# Patient Record
Sex: MALE | HISPANIC OR LATINO | Employment: OTHER | URBAN - METROPOLITAN AREA
[De-identification: names, ages, dates, MRNs, and addresses within clinical notes are randomized per-mention and may not be internally consistent; named-entity substitution may affect disease eponyms.]

---

## 2020-08-29 ENCOUNTER — HOSPITAL ENCOUNTER (EMERGENCY)
Facility: OTHER | Age: 30
Discharge: HOME | End: 2020-08-30
Attending: EMERGENCY MEDICINE

## 2020-08-29 DIAGNOSIS — R05.9 COUGH: ICD-10-CM

## 2020-08-29 DIAGNOSIS — J18.9 PNEUMONIA OF RIGHT MIDDLE LOBE DUE TO INFECTIOUS ORGANISM: Primary | ICD-10-CM

## 2020-08-29 DIAGNOSIS — R04.2 COUGH WITH HEMOPTYSIS: ICD-10-CM

## 2020-08-29 PROCEDURE — 99283 EMERGENCY DEPT VISIT LOW MDM: CPT | Mod: 25

## 2020-08-30 ENCOUNTER — TELEPHONE (OUTPATIENT)
Dept: EMERGENCY MEDICINE | Facility: OTHER | Age: 30
End: 2020-08-30

## 2020-08-30 VITALS
SYSTOLIC BLOOD PRESSURE: 133 MMHG | HEIGHT: 68 IN | DIASTOLIC BLOOD PRESSURE: 70 MMHG | TEMPERATURE: 98 F | OXYGEN SATURATION: 99 % | HEART RATE: 70 BPM | RESPIRATION RATE: 18 BRPM | BODY MASS INDEX: 25.01 KG/M2 | WEIGHT: 165 LBS

## 2020-08-30 PROCEDURE — 63700000 PHARM REV CODE 250 ALT 637 W/O HCPCS: Performed by: EMERGENCY MEDICINE

## 2020-08-30 RX ORDER — AZITHROMYCIN 250 MG/1
250 TABLET, FILM COATED ORAL DAILY
Qty: 6 TABLET | Refills: 0 | Status: SHIPPED | OUTPATIENT
Start: 2020-08-30

## 2020-08-30 RX ORDER — AZITHROMYCIN 250 MG/1
500 TABLET, FILM COATED ORAL
Status: COMPLETED | OUTPATIENT
Start: 2020-08-30 | End: 2020-08-30

## 2020-08-30 RX ADMIN — AZITHROMYCIN MONOHYDRATE 500 MG: 250 TABLET ORAL at 12:08

## 2020-08-30 NOTE — ED PROVIDER NOTES
Encounter Date: 8/29/2020    SCRIBE #1 NOTE: I, Damaris Taco, am scribing for, and in the presence of, Dr. Álvarez.       History     Chief Complaint   Patient presents with    Hemoptysis     anytime he coughs, blood mixed with mucus comes up.      Time seen by provider: 11:21 PM    This is a 29 y.o. male who presents with complaint of cough since yesterday evening with small specks of blood in saliva. He states that cough is mostly producing saliva and denies any presence of blood clots in sputum. He denies any associated pain or shortness of breath. He denies any recent fever, N/V, dyspnea on exertion, or concern for COVID19 infection. He smokes 3-4 cigarettes per day. He does not drink alcohol. He denies any history of asthma, lung disease, or tuberculosis. He denies additional complaints at this time.    The history is provided by the patient.     Review of patient's allergies indicates:  No Known Allergies  History reviewed. No pertinent past medical history.  No past surgical history on file.  No family history on file.  Social History     Tobacco Use    Smoking status: Not on file   Substance Use Topics    Alcohol use: Not on file    Drug use: Not on file     Review of Systems   Constitutional: Negative for fever.   HENT: Negative for rhinorrhea and sore throat.    Eyes: Negative for photophobia.   Respiratory: Positive for cough (with specks of blood in sputum). Negative for shortness of breath.    Cardiovascular: Negative for chest pain.   Gastrointestinal: Negative for nausea and vomiting.   Musculoskeletal: Negative for back pain.   Skin: Negative for rash.   Neurological: Negative for weakness.   Psychiatric/Behavioral: Negative for confusion.       Physical Exam     Initial Vitals [08/29/20 2257]   BP Pulse Resp Temp SpO2   132/82 90 18 98.1 °F (36.7 °C) 99 %      MAP       --         Physical Exam    Nursing note and vitals reviewed.  Constitutional: He appears well-developed and well-nourished. He  is not diaphoretic. No distress.   HENT:   Head: Normocephalic and atraumatic.   Mouth/Throat: Oropharynx is clear and moist.   Eyes: Conjunctivae and EOM are normal. Pupils are equal, round, and reactive to light.   No pallor or icterus.   Neck: Normal range of motion. Neck supple.   Cardiovascular: Normal rate, regular rhythm and normal heart sounds.   Pulmonary/Chest: Breath sounds normal. No respiratory distress.   Good air exchange. No basilar rales. No expiratory wheeze. No cough during exam.   Abdominal: Soft. Bowel sounds are normal. There is no abdominal tenderness.   Musculoskeletal: Normal range of motion. No tenderness or edema.   Neurological: He is alert and oriented to person, place, and time.   Skin: Skin is warm and dry. Capillary refill takes less than 2 seconds. No rash noted.   Psychiatric: Judgment and thought content normal.         ED Course   Procedures  Labs Reviewed - No data to display       Imaging Results          X-Ray Chest PA And Lateral (Final result)  Result time 08/29/20 23:38:39    Final result by Brennan Andersen MD (08/29/20 23:38:39)                 Impression:      Mild right middle lobe infiltrate could represent mild pneumonitis.  Recommend clinical correlation.      Electronically signed by: Brennan Andersen  Date:    08/29/2020  Time:    23:38             Narrative:    EXAMINATION:  XR CHEST PA AND LATERAL    CLINICAL HISTORY:  Cough    TECHNIQUE:  PA and lateral views of the chest were performed.    COMPARISON:  None    FINDINGS:  Heart is normal in size.  No edema is detected.    Mild obscuration of the right heart border could represent mild infiltrate of the right middle lobe.    No significant abnormality elsewhere.    No effusion or pneumothorax.  No acute osseous abnormality.                              X-Rays:   Independently Interpreted Readings:   Chest X-Ray: Increased markings to right perihilar area. No pneumothorax or effusion.     Medical Decision Making:    History:   Old Medical Records: I decided to obtain old medical records.  Independently Interpreted Test(s):   I have ordered and independently interpreted X-rays - see prior notes.  Clinical Tests:   Radiological Study: Ordered and Reviewed            Scribe Attestation:   Scribe #1: I performed the above scribed service and the documentation accurately describes the services I performed. I attest to the accuracy of the note.    Attending Attestation:           Physician Attestation for Scribe:  Physician Attestation Statement for Scribe #1: I, Dr. Álvarez, reviewed documentation, as scribed by Damaris España in my presence, and it is both accurate and complete.                    Healthy male who presents complaining of cough with scant blood.  He denies fevers or chills, denies exposure to COVID.  He does smoke cigarettes.  Counseled on cessation.  He denies feeling short of breath, even with exertion well-appearing on exam, afebrile with stable vital signs.  Chest x-ray shows mild infiltrate.  No apically lesions, no history of exposure to TB.  No weight loss.  Will treat presumptively for community-acquired pneumonia with Z-Santhosh.  Return precautions discussed.  Encouraged follow-up with primary care, especially if symptoms persist.             Clinical Impression:     1. Pneumonia of right middle lobe due to infectious organism    2. Cough    3. Cough with hemoptysis                ED Disposition Condition    Discharge Stable        ED Prescriptions     None        Follow-up Information     Follow up With Specialties Details Why Contact Info Additional Information    [facility] [address] Internal Medicine In 1 week  [address] [id number] [phone] Internal Medicine - [facility], 8th Floor, [address] Please park in [address] and use [place] elevators                                     Fran Álvarez II, MD  08/30/20 4820

## 2020-08-30 NOTE — ED TRIAGE NOTES
Pt presents to the Ed with a complaint of coughing up blood sometimes. Pt states that it is mixed with mucus. Denies fever or pain.     Patient identifiers verified and correct.     APPEARANCE: Patient not in acute distress.  NEURO: Awake, alert, appropriate for age, condition, and situation, pupils equal, round, and reactive.   HEENT: Head symmetrical. Eyes bilateral.  Bilateral ears without drainage. Bilateral nares patent, throat clear.  CARDIAC: Regular rate and rhythm  RESPIRATORY: Airway is open and patent. Respirations are normal and spontaneous on room air.   GI/: Abdomen soft and non-distended.    NEUROVASCULAR: All extremities are warm and pink. .  MUSCULOSKELETAL: Moves all extremities.   SKIN: Warm and dry, adequate turgor, mucus membranes moist and pink; no breakdown, lesions, or ecchymosis noted.     Will continue to monitor.

## 2024-10-08 ENCOUNTER — TELEPHONE (OUTPATIENT)
Dept: GASTROENTEROLOGY | Facility: CLINIC | Age: 34
End: 2024-10-08

## 2024-10-08 NOTE — TELEPHONE ENCOUNTER
--Left a message for the patient to call back to get his appt scheduled.      --- Message from Gayathri sent at 10/7/2024  3:19 PM CDT -----  Regarding: Appt Access  Contact: [phone]  Patient calling to schedule visit for abdominal pain , bloating and heartburn. Pls call

## 2024-10-08 NOTE — TELEPHONE ENCOUNTER
Pt will keep the appt he has      ----- Message from Akua sent at 10/8/2024  1:57 PM CDT -----  Regarding: missed call - appt offer  Contact: [phone]  PT returning call - message left of appt cancelled appt available today. Please advise    Patient can be contacted @# [phone]

## 2024-11-14 NOTE — PROGRESS NOTES
"     GASTROENTEROLOGY CLINIC NOTE    Chief Complaint: The primary encounter diagnosis was Dyspepsia. A diagnosis of Abdominal bloating was also pertinent to this visit.  Referring provider/PCP: No, Primary Doctor    HPI  Ayaan Schumacher is a 33 y.o. male who is a new patient to me who presents to clinic for abdominal pain.   This is a new problem that began two months ago. Sought care in ED; workup negative.   CT without any significant findings to explain abdominal pain. Protonix and Bentyl initiated. This has helped pain/discomfort. Similar episode occurred about two years ago.     Abdominal Pain  How Long: Two months (resolved)   Frequency: Intermittent every few months  Lasting 1-2 days   Location: Upper abdomen   Quality: Describes discomfort, bloating   "Heat"   Onset: gradual   Radiate: no   Aggravated or Improved with bowel movement/eating/movement No change with movement or bowel movements  Worse after eating   Nausea/Vomiting/Unexplained Weight Loss: no   Pyrosis/Reflux/Dysphagia: Heartburn vidya with greasy foods  When occurs feels he is unable to belch  Water brash   Bowel Movements: Normal daily bowel movements   Melena/Hematochezia: no    Symptoms: N/A     Treatments: Protonix, Bentyl  NSAIDs: no  GLP-1s: No  Anticoagulation or Antiplatelet: No    History of H.pylori: no  H.pylori Treatment:  Prior Upper Endoscopy: no  Prior Colonoscopy: no  Family h/o Colon Cancer: No  Family h/o Crohn's Disease or Ulcerative Colitis: No  Family h/o Celiac Sprue: No  Abdominal Surgeries: no        Review of Systems   Constitutional:  Negative for weight loss.   HENT:  Negative for sore throat.    Eyes:  Negative for blurred vision.   Respiratory:  Negative for cough.    Cardiovascular:  Negative for chest pain.   Gastrointestinal:  Positive for abdominal pain and heartburn. Negative for blood in stool, constipation, diarrhea, melena, nausea and vomiting.   Genitourinary:  Negative for dysuria.   Musculoskeletal:  " "Negative for myalgias.   Skin:  Negative for rash.   Neurological:  Negative for headaches.   Endo/Heme/Allergies:  Negative for environmental allergies.   Psychiatric/Behavioral:  Negative for suicidal ideas. The patient is not nervous/anxious.        Past Medical History: has no past medical history on file.    Past Surgical History: has no past surgical history on file.    Family History:family history is not on file.    Allergies: Review of patient's allergies indicates:  No Known Allergies    Social History: reports that he has been smoking cigarettes. He has been exposed to tobacco smoke. He has never used smokeless tobacco. He reports that he does not drink alcohol and does not use drugs.    Home medications:   Current Outpatient Medications on File Prior to Visit   Medication Sig Dispense Refill    albuterol (PROVENTIL/VENTOLIN HFA) 90 mcg/actuation inhaler Inhale 2 puffs into the lungs every 6 (six) hours as needed. Rescue 18 g 0    dextromethorphan-guaiFENesin  mg/5 ml (ROBITUSSIN-DM)  mg/5 mL liquid Take 10 mLs by mouth 4 (four) times daily as needed. 354 mL 0    [DISCONTINUED] dicyclomine (BENTYL) 20 mg tablet Take 20 mg by mouth 2 (two) times daily.      [DISCONTINUED] pantoprazole (PROTONIX) 40 MG tablet Take 1 tablet (40 mg total) by mouth once daily. 30 tablet 0    [DISCONTINUED] azithromycin (Z-AMIRA) 250 MG tablet Take 1 tablet (250 mg total) by mouth once daily. Take first 2 tablets together, then 1 every day until finished. 6 tablet 0     No current facility-administered medications on file prior to visit.       Vital signs:  /85 (BP Location: Left arm, Patient Position: Sitting)   Pulse 79   Ht 5' 4" (1.626 m)   Wt 79.9 kg (176 lb 2.4 oz)   SpO2 97%   BMI 30.24 kg/m²     Physical Exam  Vitals reviewed.   Constitutional:       General: He is not in acute distress.     Appearance: Normal appearance. He is not ill-appearing.   HENT:      Head: Normocephalic.   Cardiovascular: " "     Rate and Rhythm: Normal rate and regular rhythm.      Heart sounds: Normal heart sounds. No murmur heard.  Pulmonary:      Effort: Pulmonary effort is normal. No respiratory distress.      Breath sounds: Normal breath sounds.   Chest:      Chest wall: No tenderness.   Abdominal:      General: Bowel sounds are normal. There is no distension.      Palpations: Abdomen is soft.      Tenderness: There is no abdominal tenderness. Negative signs include Simon's sign.      Hernia: No hernia is present.   Skin:     General: Skin is warm.   Neurological:      Mental Status: He is alert and oriented to person, place, and time.   Psychiatric:         Mood and Affect: Mood normal.         Behavior: Behavior normal.         Routine labs:  Lab Results   Component Value Date    WBC 4.54 09/02/2024    HGB 14.6 09/02/2024    HCT 45.0 09/02/2024    MCV 84 09/02/2024     09/02/2024     No results found for: "INR"  No results found for: "IRON", "FERRITIN", "TIBC", "FESATURATED"  Lab Results   Component Value Date     09/02/2024    K 4.5 09/02/2024     09/02/2024    CO2 26 09/02/2024    BUN 9 09/02/2024    CREATININE 0.9 09/02/2024     Lab Results   Component Value Date    ALBUMIN 3.9 09/02/2024    ALT 18 09/02/2024    AST 20 09/02/2024    ALKPHOS 105 09/02/2024    BILITOT 0.4 09/02/2024     No results found for: "GLUCOSE"  No results found for: "TSH"  Lab Results   Component Value Date    CALCIUM 9.1 09/02/2024       Imaging:  CT Abdomen Pelvis With IV Contrast NO Oral Contrast  Narrative: EXAMINATION:  CT ABDOMEN PELVIS WITH IV CONTRAST    CLINICAL HISTORY:  Abdominal pain, acute, nonlocalized;    TECHNIQUE:  Low dose axial images, sagittal and coronal reformations were obtained from the lung bases to the pubic symphysis.  Contrast was not administered.    Low dose axial images, sagittal and coronal reformations were obtained from the lung bases to the pubic symphysis following the IV administration of 100 mL " of Omnipaque 350 .  Oral contrast was not given.    COMPARISON:  None    FINDINGS:  Heart: Normal in size. No pericardial effusion.    Lung Bases: There is scarring type density with bronchiectasis in both the lingula is, lower lobe on the right and the middle lobe on the right.    Liver: Normal in size and attenuation, with no focal hepatic lesions.    Gallbladder: No calcified gallstones.    Bile Ducts: No evidence of dilated ducts.    Pancreas: No mass or peripancreatic fat stranding.    Spleen: Unremarkable.    Adrenals: Unremarkable.    Kidneys/ Ureters: Unremarkable.    Bladder: No evidence of wall thickening.    Reproductive organs: Unremarkable.    GI Tract/Mesentery: Minimal left and sigmoid colon diverticulosis.  No evidence of bowel obstruction or inflammation.    Peritoneal Space: No ascites. No free air.    Retroperitoneum: No significant adenopathy.    Abdominal wall: Fat containing inguinal hernias bilaterally.  No bowel involvement.    Vasculature: No significant atherosclerosis or aneurysm.    Bones: No acute fracture.  Impression: Colonic diverticulosis without diverticulitis.    Scar-like densities and bronchiectasis in both lung bases.  Acute on chronic inflammatory infectious lung process difficult to exclude.  Correlation needed.    Electronically signed by: Francesco Acuna  Date:    09/02/2024  Time:    18:17      I have reviewed prior labs, imaging, and notes.      Assessment:  1. Dyspepsia    2. Abdominal bloating      Dyspepsia accompanied by abdominal bloating and discomfort.   Heartburn with eating greasy foods.   Improved with Protonix and Bentyl.     Plan:  Orders Placed This Encounter    H. pylori Antibody, IgG    dicyclomine (BENTYL) 20 mg tablet    pantoprazole (PROTONIX) 40 MG tablet     Serum H.pylori  Protonix 40mg daily  Bentyl PRN    Plan of care discussed with patient who is in agreement and verbalized understanding.     I have explained the planned procedures to the  patient.The risks, benefits and alternatives of the procedure were also explained in detail. Patient verbalized understanding, all questions were answered. The patient agrees to proceed as planned    Follow Up: KARAN Rodarte,FNP-BC [facility]    (Portions of this note were dictated using voice recognition software and may contain dictation related errors in spelling/grammar/syntax not found on text review)

## 2024-11-15 ENCOUNTER — OFFICE VISIT (OUTPATIENT)
Dept: GASTROENTEROLOGY | Facility: CLINIC | Age: 34
End: 2024-11-15
Payer: COMMERCIAL

## 2024-11-15 VITALS
WEIGHT: 176.13 LBS | OXYGEN SATURATION: 97 % | BODY MASS INDEX: 30.07 KG/M2 | SYSTOLIC BLOOD PRESSURE: 121 MMHG | HEIGHT: 64 IN | HEART RATE: 79 BPM | DIASTOLIC BLOOD PRESSURE: 85 MMHG

## 2024-11-15 DIAGNOSIS — R14.0 ABDOMINAL BLOATING: ICD-10-CM

## 2024-11-15 DIAGNOSIS — R10.13 DYSPEPSIA: Primary | ICD-10-CM

## 2024-11-15 PROCEDURE — 99999 PR PBB SHADOW E&M-EST. PATIENT-LVL III: CPT | Mod: PBBFAC,,, | Performed by: NURSE PRACTITIONER

## 2024-11-15 RX ORDER — DICYCLOMINE HYDROCHLORIDE 20 MG/1
20 TABLET ORAL 3 TIMES DAILY PRN
Qty: 60 TABLET | Refills: 0 | Status: SHIPPED | OUTPATIENT
Start: 2024-11-15 | End: 2024-12-15

## 2024-11-15 RX ORDER — DICYCLOMINE HYDROCHLORIDE 20 MG/1
20 TABLET ORAL 2 TIMES DAILY
COMMUNITY
End: 2024-11-15 | Stop reason: SDUPTHER

## 2024-11-15 RX ORDER — PANTOPRAZOLE SODIUM 40 MG/1
40 TABLET, DELAYED RELEASE ORAL DAILY
Qty: 90 TABLET | Refills: 3 | Status: SHIPPED | OUTPATIENT
Start: 2024-11-15 | End: 2025-11-15

## 2025-02-17 ENCOUNTER — TELEPHONE (OUTPATIENT)
Dept: GASTROENTEROLOGY | Facility: CLINIC | Age: 35
End: 2025-02-17
Payer: COMMERCIAL

## 2025-02-17 NOTE — TELEPHONE ENCOUNTER
----- Message from Darnell Dee sent at 2/17/2025  9:07 AM CST -----    ----- Message -----  From: Kristan Guo  Sent: 2/14/2025   5:04 PM CST  To: [facility] Staff    Type:  Appointment Request    Name of Caller:LADAN PULIDO [[id number]]    When is the first available appointment?No access    Symptom: Abdominal Pain - Male  Outcome: Schedule an appointment to be seen within 24 hours.  Reason: Caller denied all higher acuity questions  The caller accepted this outcome.    Would the patient rather a call back or a response via [facility]? Call back     Best Call Back Number:[phone]    Additional Information: patient states he was advised by the provider if he still had abdominal pain to follow up with her. Patient states he is still experiencing this pain and would like to schedule an appointment with the provider as soon as possible. Patient was advised the providers office I closed for the weekend, and offered to speak with Nurse on call for medical advice. Patient states he would like to speak with the providers office if possible for 02/17.  Patient given number  to Nurse on call for medical assistance.